# Patient Record
Sex: MALE | Race: OTHER | HISPANIC OR LATINO | Employment: PART TIME | URBAN - METROPOLITAN AREA
[De-identification: names, ages, dates, MRNs, and addresses within clinical notes are randomized per-mention and may not be internally consistent; named-entity substitution may affect disease eponyms.]

---

## 2021-04-05 ENCOUNTER — HOSPITAL ENCOUNTER (EMERGENCY)
Facility: HOSPITAL | Age: 41
Discharge: HOME | End: 2021-04-05
Attending: EMERGENCY MEDICINE | Admitting: EMERGENCY MEDICINE
Payer: COMMERCIAL

## 2021-04-05 ENCOUNTER — APPOINTMENT (EMERGENCY)
Dept: RADIOLOGY | Facility: HOSPITAL | Age: 41
End: 2021-04-05
Payer: COMMERCIAL

## 2021-04-05 VITALS
SYSTOLIC BLOOD PRESSURE: 132 MMHG | HEART RATE: 81 BPM | DIASTOLIC BLOOD PRESSURE: 82 MMHG | WEIGHT: 170 LBS | TEMPERATURE: 98.1 F | BODY MASS INDEX: 29.02 KG/M2 | RESPIRATION RATE: 16 BRPM | OXYGEN SATURATION: 97 % | HEIGHT: 64 IN

## 2021-04-05 DIAGNOSIS — S42.025A NONDISPLACED FRACTURE OF SHAFT OF LEFT CLAVICLE, INITIAL ENCOUNTER FOR CLOSED FRACTURE: Primary | ICD-10-CM

## 2021-04-05 PROCEDURE — 96372 THER/PROPH/DIAG INJ SC/IM: CPT

## 2021-04-05 PROCEDURE — 99284 EMERGENCY DEPT VISIT MOD MDM: CPT | Performed by: EMERGENCY MEDICINE

## 2021-04-05 PROCEDURE — 73030 X-RAY EXAM OF SHOULDER: CPT

## 2021-04-05 PROCEDURE — 99284 EMERGENCY DEPT VISIT MOD MDM: CPT

## 2021-04-05 RX ORDER — OXYCODONE HYDROCHLORIDE 5 MG/1
5 TABLET ORAL ONCE
Status: COMPLETED | OUTPATIENT
Start: 2021-04-05 | End: 2021-04-05

## 2021-04-05 RX ORDER — KETOROLAC TROMETHAMINE 30 MG/ML
15 INJECTION, SOLUTION INTRAMUSCULAR; INTRAVENOUS ONCE
Status: COMPLETED | OUTPATIENT
Start: 2021-04-05 | End: 2021-04-05

## 2021-04-05 RX ORDER — LIDOCAINE 50 MG/G
1 PATCH TOPICAL ONCE
Status: DISCONTINUED | OUTPATIENT
Start: 2021-04-05 | End: 2021-04-05 | Stop reason: HOSPADM

## 2021-04-05 RX ORDER — OXYCODONE HYDROCHLORIDE 5 MG/1
5 TABLET ORAL EVERY 6 HOURS PRN
Qty: 8 TABLET | Refills: 0 | Status: SHIPPED | OUTPATIENT
Start: 2021-04-05 | End: 2021-05-06 | Stop reason: ALTCHOICE

## 2021-04-05 RX ADMIN — LIDOCAINE 1 PATCH: 50 PATCH TOPICAL at 09:27

## 2021-04-05 RX ADMIN — KETOROLAC TROMETHAMINE 15 MG: 30 INJECTION, SOLUTION INTRAMUSCULAR at 09:36

## 2021-04-05 RX ADMIN — OXYCODONE HYDROCHLORIDE 5 MG: 5 TABLET ORAL at 10:05

## 2021-04-05 NOTE — ED ATTENDING ATTESTATION
4/5/2021  IKeeley MD, saw and evaluated the patient  I have discussed the patient with the resident/non-physician practitioner and agree with the resident's/non-physician practitioner's findings, Plan of Care, and MDM as documented in the resident's/non-physician practitioner's note, except where noted  All available labs and Radiology studies were reviewed  I was present for key portions of any procedure(s) performed by the resident/non-physician practitioner and I was immediately available to provide assistance  At this point I agree with the current assessment done in the Emergency Department  I have conducted an independent evaluation of this patient a history and physical is as follows:    70-year-old man presenting with left shoulder injury  Patient states he tripped going down stairs and fell forward with his left shoulder striking a wall  Had immediate onset of pain to the left anterior shoulder  No posterior shoulder pain  No numbness or tingling  No other complaints  Patient is awake and alert, appears uncomfortable  Head is atraumatic normocephalic  Neck is supple there is tenderness over the mid clavicle  No laceration or tenting of skin  No lateral or posterior shoulder tenderness  2+ radial pulse, distal motor and sensory intact  X-ray shows mid clavicular fracture  Will place patient in shoulder sling and give referral to Orthopedics for follow-up  Will provide short-term pain medication prescription      ED Course         Critical Care Time  Procedures

## 2021-04-05 NOTE — ED PROVIDER NOTES
History  Chief Complaint   Patient presents with   Chester Nearing     fell down 2 steps and injured left shoulder and back, did not strike head, no loc     HPI  37 yo male with no significant PMH presents to the ED with left shoulder pain s/p slipping down 2 stairs and falling to his left into a wall this morning  No head strike, LOC, or neck pain  No numbness or weakness in the hand  No elbow or wrist pain  No chest pain or shortness of breath  Pain is worse with movement of the shoulder  Pt does not take any blood thinners  No prior hx shoulder injuries  No other complaints at this time  None       History reviewed  No pertinent past medical history  Past Surgical History:   Procedure Laterality Date    NEPHRECTOMY Right        History reviewed  No pertinent family history  I have reviewed and agree with the history as documented  E-Cigarette/Vaping     E-Cigarette/Vaping Substances     Social History     Tobacco Use    Smoking status: Current Every Day Smoker     Packs/day: 0 50     Types: Cigarettes   Substance Use Topics    Alcohol use: Not on file    Drug use: Not on file        Review of Systems   Constitutional: Negative for chills and fever  HENT: Negative for congestion, rhinorrhea and sore throat  Respiratory: Negative for cough and shortness of breath  Cardiovascular: Negative for chest pain and palpitations  Gastrointestinal: Negative for abdominal pain, nausea and vomiting  Genitourinary: Negative for dysuria and hematuria  Musculoskeletal: Positive for arthralgias and myalgias  Negative for neck pain  Skin: Negative for rash  Neurological: Negative for dizziness, syncope, weakness, light-headedness, numbness and headaches  All other systems reviewed and are negative        Physical Exam  ED Triage Vitals   Temperature Pulse Respirations Blood Pressure SpO2   04/05/21 0906 04/05/21 0904 04/05/21 0904 04/05/21 0904 04/05/21 0904   98 1 °F (36 7 °C) 81 16 132/82 97 %      Temp Source Heart Rate Source Patient Position - Orthostatic VS BP Location FiO2 (%)   04/05/21 0906 -- -- -- --   Oral          Pain Score       04/05/21 0904       9             Orthostatic Vital Signs  Vitals:    04/05/21 0904   BP: 132/82   Pulse: 81       Physical Exam  Vitals signs and nursing note reviewed  Constitutional:       General: He is not in acute distress  Appearance: He is well-developed  He is not diaphoretic  HENT:      Head: Normocephalic and atraumatic  Right Ear: External ear normal       Left Ear: External ear normal       Mouth/Throat:      Mouth: Mucous membranes are moist    Eyes:      Conjunctiva/sclera: Conjunctivae normal       Pupils: Pupils are equal, round, and reactive to light  Neck:      Musculoskeletal: Normal range of motion and neck supple  No neck rigidity or spinous process tenderness  Trachea: Trachea normal    Cardiovascular:      Rate and Rhythm: Normal rate and regular rhythm  Pulses:           Radial pulses are 2+ on the right side and 2+ on the left side  Heart sounds: Normal heart sounds  No murmur  No friction rub  No gallop  Pulmonary:      Effort: Pulmonary effort is normal  No respiratory distress  Breath sounds: Normal breath sounds  No wheezing, rhonchi or rales  Abdominal:      General: Bowel sounds are normal  There is no distension  Palpations: Abdomen is soft  Tenderness: There is no abdominal tenderness  Musculoskeletal:      Left shoulder: He exhibits decreased range of motion (limited abduction due to pain), bony tenderness (anterior shoulder/clavicle) and pain  He exhibits no swelling, no effusion, no crepitus and no deformity  Left elbow: He exhibits normal range of motion  No tenderness found  Left wrist: He exhibits normal range of motion and no tenderness  Cervical back: He exhibits no bony tenderness  Thoracic back: He exhibits no bony tenderness        Lumbar back: He exhibits no bony tenderness  Lymphadenopathy:      Cervical: No cervical adenopathy  Skin:     General: Skin is warm and dry  Neurological:      Mental Status: He is alert and oriented to person, place, and time  Cranial Nerves: No cranial nerve deficit  Sensory: No sensory deficit  ED Medications  Medications   ketorolac (TORADOL) injection 15 mg (15 mg Intramuscular Given 4/5/21 0936)   oxyCODONE (ROXICODONE) IR tablet 5 mg (5 mg Oral Given 4/5/21 1005)       Diagnostic Studies  Results Reviewed     None                 XR shoulder 2+ views LEFT   ED Interpretation by Maris Garcia MD (04/05 7152)   Clavicle fracture  Final Result by Hayden Washington MD (04/05 1032)      Acute mildly displaced oblique mid clavicle fracture      Workstation performed: [id number]               Procedures  Procedures      ED Course  ED Course as of Apr 05 1300   Mon Apr 05, 2021   1422 Clavicle fracture, not significantly displaced  Will apply sling and discharge with strict return precautions and ortho follow up   XR shoulder 2+ views LEFT                                       [facility]  39 yo male with PMH left shoulder pain s/p slip and fall down stairs without head strike  Will obtain XR left shoulder to evaluate for bony injury, treat with toradol and lidoderm and reassess  Pt will likely be discharged to follow up with orthopedic surgery and strict return precautions     Disposition  Final diagnoses:   Nondisplaced fracture of shaft of left clavicle, initial encounter for closed fracture     Time reflects when diagnosis was documented in both MDM as applicable and the Disposition within this note     Time User Action Codes Description Comment    4/5/2021  9:42 AM Regina Brochure Add [S42 025A] Nondisplaced fracture of shaft of left clavicle, initial encounter for closed fracture       ED Disposition     ED Disposition Condition Date/Time Comment    Discharge Stable Mon Apr 5, 2021  9:41 AM Hollie Dunnell discharge to home/self care  Follow-up Information     Follow up With Specialties Details Why Contact Info Additional [address] [facility] Orthopedic Surgery Schedule an appointment as soon as possible for a visit in 3 days  [place] [address]  [phone] [address] [address], [place], [place], [address]          Discharge Medication List as of 4/5/2021  9:55 AM      START taking these medications    Details   oxyCODONE (ROXICODONE) 5 mg immediate release tablet Take 1 tablet (5 mg total) by mouth every 6 (six) hours as needed for moderate pain for up to 8 dosesMax Daily Amount: 20 mg, Starting Mon 4/5/2021, Normal           No discharge procedures on file  PDMP Review     None           ED Provider  Attending physically available and evaluated Mick Wilhelm I managed the patient along with the ED Attending      Electronically Signed by         Kevyn Alberts MD  04/05/21 0396

## 2021-04-05 NOTE — Clinical Note
Kelley Álvarez was seen and treated in our emergency department on 4/5/2021  No use of the left arm until cleared by orthopedic specialist    Diagnosis:     Pieter Rhodes    He may return on this date: 04/07/2021         If you have any questions or concerns, please don't hesitate to call        Atha Severance, MD    ______________________________           _______________          _______________  Hospital Representative                              Date                                Time

## 2021-04-05 NOTE — DISCHARGE INSTRUCTIONS
Take ibuprofen and tylenol for pain as needed  For more severe pain take prescribed oxycodone  Do not take oxycodone if working or driving  Wear sling as directed  Follow up with orthopedic doctor for further management  Return to the emergency department for severe worsening of pain, numbness in your hand, any other new or concerning symptoms  Christine Moors y tylenol para el dolor según sea necesario  Para un dolor más severo, tome oxicodona recetada  No tome oxicodona si trabaja o conduce  Use un cabestrillo lucio se le indique  Gordon un seguimiento con el médico ortopédico para un tratamiento adicional  Regrese al departamento de emergencias por un empeoramiento severo del dolor, entumecimiento en la mano o cualquier otro síntoma nuevo o preocupante

## 2021-04-17 ENCOUNTER — HOSPITAL ENCOUNTER (EMERGENCY)
Facility: HOSPITAL | Age: 41
Discharge: HOME | End: 2021-04-18
Attending: EMERGENCY MEDICINE | Admitting: EMERGENCY MEDICINE
Payer: COMMERCIAL

## 2021-04-17 DIAGNOSIS — L03.115 CELLULITIS OF RIGHT ANTERIOR LOWER LEG: Primary | ICD-10-CM

## 2021-04-17 PROCEDURE — 99283 EMERGENCY DEPT VISIT LOW MDM: CPT

## 2021-04-17 PROCEDURE — 99284 EMERGENCY DEPT VISIT MOD MDM: CPT | Performed by: EMERGENCY MEDICINE

## 2021-04-18 VITALS
HEART RATE: 62 BPM | BODY MASS INDEX: 32.44 KG/M2 | RESPIRATION RATE: 18 BRPM | DIASTOLIC BLOOD PRESSURE: 72 MMHG | WEIGHT: 189 LBS | TEMPERATURE: 97.8 F | SYSTOLIC BLOOD PRESSURE: 121 MMHG | OXYGEN SATURATION: 97 %

## 2021-04-18 RX ORDER — SULFAMETHOXAZOLE AND TRIMETHOPRIM 800; 160 MG/1; MG/1
1 TABLET ORAL 2 TIMES DAILY
Qty: 14 TABLET | Refills: 0 | Status: SHIPPED | OUTPATIENT
Start: 2021-04-18 | End: 2021-04-25

## 2021-04-18 RX ORDER — CEPHALEXIN 250 MG/1
500 CAPSULE ORAL 4 TIMES DAILY
Qty: 56 CAPSULE | Refills: 0 | Status: SHIPPED | OUTPATIENT
Start: 2021-04-18 | End: 2021-04-25

## 2021-04-18 RX ORDER — CEPHALEXIN 250 MG/1
500 CAPSULE ORAL ONCE
Status: COMPLETED | OUTPATIENT
Start: 2021-04-18 | End: 2021-04-18

## 2021-04-18 RX ORDER — SULFAMETHOXAZOLE AND TRIMETHOPRIM 800; 160 MG/1; MG/1
1 TABLET ORAL ONCE
Status: COMPLETED | OUTPATIENT
Start: 2021-04-18 | End: 2021-04-18

## 2021-04-18 RX ADMIN — CEPHALEXIN 500 MG: 250 CAPSULE ORAL at 00:33

## 2021-04-18 RX ADMIN — SULFAMETHOXAZOLE AND TRIMETHOPRIM 1 TABLET: 800; 160 TABLET ORAL at 00:33

## 2021-04-18 NOTE — ED PROVIDER NOTES
History  Chief Complaint   Patient presents with    Wound Check     Pt with wound to right shin from fall on Monday  Pt reports applying antibiotic oinment and taking amoxicillin for  days  Right shin now reddened and painful to touch  HPI  39 yo male with no significant known PMH presents to the ED with concern for increasing pain and redness of the skin around a laceration he sustained to his right anterior lower leg when falling down 2 stairs 2 weeks ago  States he cut himself on a piece of jagged wood  Pt was evaluated in the ED at that time and found to have a nondisplaced fracture of the left clavicle, however he did not mention laceration to his leg when he was seen at that time  Pt states he has been applying over the counter bacitracin at home and doing salt water soaks, however he was developing signs of infection so for the last 5 days has been taking a medication that he believes is amoxicillin 500 mg Q8H  Pt is adamant he bought this medication from the store without a prescription, but he cannot identify which store to me  No fevers, chills, nausea, vomiting, chest pain, SOB, abdominal pain, dizziness, lightheadedness, numbness in the foot, difficulty walking  Pt has not yet made appointment to follow up with orthopedics about his clavicle  Prior to Admission Medications   Prescriptions Last Dose Informant Patient Reported? Taking?   oxyCODONE (ROXICODONE) 5 mg immediate release tablet   No Yes   Sig: Take 1 tablet (5 mg total) by mouth every 6 (six) hours as needed for moderate pain for up to 8 dosesMax Daily Amount: 20 mg      Facility-Administered Medications: None       History reviewed  No pertinent past medical history  Past Surgical History:   Procedure Laterality Date    NEPHRECTOMY Right        History reviewed  No pertinent family history  I have reviewed and agree with the history as documented      E-Cigarette/Vaping    E-Cigarette Use Never User      E-Cigarette/Vaping Substances     Social History     Tobacco Use    Smoking status: Former Smoker     Packs/day: 0 50     Types: Cigarettes    Smokeless tobacco: Never Used   Substance Use Topics    Alcohol use: Never     Frequency: Never    Drug use: Yes     Types: Marijuana        Review of Systems   Constitutional: Negative for chills and fever  HENT: Negative for congestion, rhinorrhea and sore throat  Respiratory: Negative for cough and shortness of breath  Cardiovascular: Negative for chest pain and palpitations  Gastrointestinal: Negative for abdominal pain, nausea and vomiting  Genitourinary: Negative for dysuria and hematuria  Musculoskeletal: Positive for arthralgias and myalgias  Skin: Positive for color change and wound  Negative for rash  Neurological: Negative for dizziness, weakness, light-headedness, numbness and headaches  All other systems reviewed and are negative  Physical Exam  ED Triage Vitals [04/18/21 0001]   Temperature Pulse Respirations Blood Pressure SpO2   97 8 °F (36 6 °C) 62 18 121/72 97 %      Temp Source Heart Rate Source Patient Position - Orthostatic VS BP Location FiO2 (%)   Oral Monitor Lying Right arm --      Pain Score       9             Orthostatic Vital Signs  Vitals:    04/18/21 0001   BP: 121/72   Pulse: 62   Patient Position - Orthostatic VS: Lying       Physical Exam  Constitutional:       General: He is not in acute distress  Appearance: He is well-developed  He is not diaphoretic  HENT:      Head: Normocephalic and atraumatic  Right Ear: External ear normal       Left Ear: External ear normal    Eyes:      Conjunctiva/sclera: Conjunctivae normal       Pupils: Pupils are equal, round, and reactive to light  Neck:      Musculoskeletal: Normal range of motion and neck supple  Cardiovascular:      Rate and Rhythm: Normal rate and regular rhythm  Pulses:           Dorsalis pedis pulses are 2+ on the right side        Heart sounds: Normal heart sounds  No murmur  No friction rub  No gallop  Pulmonary:      Effort: Pulmonary effort is normal  No respiratory distress  Breath sounds: Normal breath sounds  No wheezing, rhonchi or rales  Abdominal:      General: Bowel sounds are normal  There is no distension  Palpations: Abdomen is soft  Tenderness: There is no abdominal tenderness  Musculoskeletal: Normal range of motion  Left shoulder: He exhibits tenderness (over left clavicle  Pt is wearing sling)  Lymphadenopathy:      Cervical: No cervical adenopathy  Skin:     General: Skin is warm and dry  Comments: Right anterior lower leg approximately 5 cm old healing laceration with granulated tissue, surrounding erythema and mild tenderness   Neurological:      Mental Status: He is alert and oriented to person, place, and time  Cranial Nerves: No cranial nerve deficit  Sensory: Sensation is intact  No sensory deficit  Motor: Motor function is intact  Gait: Gait is intact  ED Medications  Medications   cephalexin (KEFLEX) capsule 500 mg (500 mg Oral Given 4/18/21 0033)   sulfamethoxazole-trimethoprim (BACTRIM DS) 800-160 mg per tablet 1 tablet (1 tablet Oral Given 4/18/21 0033)       Diagnostic Studies  Results Reviewed     None                 No orders to display         Procedures  Procedures      ED Course                                       MDM  37 yo male with pain and redness surrounding old laceration sustained 2 weeks ago  Concerning for cellulitis  Pt does not have signs of sepsis at this time  Unclear if he has been taking antibiotics at home, because they were not prescribed to him  Will prescribe course of keflex and bactrim and advise pt to follow up with PCP and also with ortho as previously directed  Given strict return precautions     Disposition  Final diagnoses:   Cellulitis of right anterior lower leg     Time reflects when diagnosis was documented in both MDM as applicable and the Disposition within this note     Time User Action Codes Description Comment    4/18/2021 12:22 AM Pradip Cason Add [O72 [id number]] Cellulitis of right anterior lower leg       ED Disposition     ED Disposition Condition Date/Time Comment    Discharge Stable Sun Apr 18, 2021 12:22 AM Brain Richmond discharge to home/self care  Follow-up Information     Follow up With Specialties Details Why [address] [facility] Schedule an appointment as soon as possible for a visit in 1 week  [address] [address] [address] [facility] [address]  [facility] [address], [address], [address] [phone]          Discharge Medication List as of 4/18/2021 12:27 AM      START taking these medications    Details   cephalexin (KEFLEX) 250 mg capsule Take 2 capsules (500 mg total) by mouth 4 (four) times a day for 7 days, Starting Sun 4/18/2021, Until Sun 4/25/2021, Normal      sulfamethoxazole-trimethoprim (BACTRIM DS) 800-160 mg per tablet Take 1 tablet by mouth 2 (two) times a day for 7 days smx-tmp DS (BACTRIM) 800-160 mg tabs (1tab q12 D10), Starting Sun 4/18/2021, Until Sun 4/25/2021, Normal         CONTINUE these medications which have NOT CHANGED    Details   oxyCODONE (ROXICODONE) 5 mg immediate release tablet Take 1 tablet (5 mg total) by mouth every 6 (six) hours as needed for moderate pain for up to 8 dosesMax Daily Amount: 20 mg, Starting Mon 4/5/2021, Normal           No discharge procedures on file  PDMP Review     None           ED Provider  Attending physically available and evaluated Brain Basilio FLORES managed the patient along with the ED Attending      Electronically Signed by         Jannet Hull MD  04/18/21 9742

## 2021-04-18 NOTE — DISCHARGE INSTRUCTIONS
[place] antibióticos según lo prescrito  Gordon un seguimiento con el médico de atención primaria en 1 semana  Regrese al departamento de emergencias si empeora el enrojecimiento, empeoramiento de la hinchazón, entumecimiento en el pie, fiebre, vómitos o cualquier otro síntoma nuevo o preocupante  Take antibiotics as prescribed  Follow up with primary care physician in 1 week  Return to the emergency department for worsening redness, worsening swelling, numbness in the foot, fever, vomiting, any other new or concerning symptoms
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
## 2021-05-05 ENCOUNTER — APPOINTMENT (OUTPATIENT)
Dept: LAB | Facility: CLINIC | Age: 41
End: 2021-05-05

## 2021-05-05 ENCOUNTER — OFFICE VISIT (OUTPATIENT)
Dept: INTERNAL MEDICINE CLINIC | Facility: CLINIC | Age: 41
End: 2021-05-05

## 2021-05-05 VITALS
HEIGHT: 64 IN | SYSTOLIC BLOOD PRESSURE: 114 MMHG | DIASTOLIC BLOOD PRESSURE: 72 MMHG | HEART RATE: 79 BPM | TEMPERATURE: 96.6 F | BODY MASS INDEX: 30.66 KG/M2 | WEIGHT: 179.6 LBS | OXYGEN SATURATION: 96 %

## 2021-05-05 DIAGNOSIS — Z90.5 H/O RIGHT NEPHRECTOMY: ICD-10-CM

## 2021-05-05 DIAGNOSIS — R29.898 WEAKNESS OF RIGHT LOWER EXTREMITY: ICD-10-CM

## 2021-05-05 DIAGNOSIS — Z00.00 HEALTHCARE MAINTENANCE: ICD-10-CM

## 2021-05-05 DIAGNOSIS — L03.115 CELLULITIS OF RIGHT ANTERIOR LOWER LEG: Primary | ICD-10-CM

## 2021-05-05 DIAGNOSIS — Z23 NEED FOR COVID-19 VACCINE: ICD-10-CM

## 2021-05-05 DIAGNOSIS — Z76.89 ENCOUNTER TO ESTABLISH CARE: ICD-10-CM

## 2021-05-05 DIAGNOSIS — Z00.00 ANNUAL PHYSICAL EXAM: ICD-10-CM

## 2021-05-05 DIAGNOSIS — Z11.4 SCREENING FOR HIV (HUMAN IMMUNODEFICIENCY VIRUS): ICD-10-CM

## 2021-05-05 DIAGNOSIS — Z13.31 DEPRESSION SCREENING: ICD-10-CM

## 2021-05-05 DIAGNOSIS — S42.022A DISPLACED FRACTURE OF SHAFT OF LEFT CLAVICLE, INITIAL ENCOUNTER FOR CLOSED FRACTURE: ICD-10-CM

## 2021-05-05 LAB
ALBUMIN SERPL BCP-MCNC: 4 G/DL (ref 3.5–5)
ALP SERPL-CCNC: 103 U/L (ref 46–116)
ALT SERPL W P-5'-P-CCNC: 47 U/L (ref 12–78)
ANION GAP SERPL CALCULATED.3IONS-SCNC: 6 MMOL/L (ref 4–13)
AST SERPL W P-5'-P-CCNC: 33 U/L (ref 5–45)
BASOPHILS # BLD AUTO: 0.08 THOUSANDS/ΜL (ref 0–0.1)
BASOPHILS NFR BLD AUTO: 1 % (ref 0–1)
BILIRUB SERPL-MCNC: 0.33 MG/DL (ref 0.2–1)
BUN SERPL-MCNC: 13 MG/DL (ref 5–25)
CALCIUM SERPL-MCNC: 9.1 MG/DL (ref 8.3–10.1)
CHLORIDE SERPL-SCNC: 106 MMOL/L (ref 100–108)
CHOLEST SERPL-MCNC: 179 MG/DL (ref 50–200)
CO2 SERPL-SCNC: 26 MMOL/L (ref 21–32)
CREAT SERPL-MCNC: 0.72 MG/DL (ref 0.6–1.3)
EOSINOPHIL # BLD AUTO: 0.36 THOUSAND/ΜL (ref 0–0.61)
EOSINOPHIL NFR BLD AUTO: 5 % (ref 0–6)
ERYTHROCYTE [DISTWIDTH] IN BLOOD BY AUTOMATED COUNT: 13.1 % (ref 11.6–15.1)
EST. AVERAGE GLUCOSE BLD GHB EST-MCNC: 114 MG/DL
GFR SERPL CREATININE-BSD FRML MDRD: 116 ML/MIN/1.73SQ M
GLUCOSE P FAST SERPL-MCNC: 99 MG/DL (ref 65–99)
HBA1C MFR BLD: 5.6 %
HCT VFR BLD AUTO: 49.1 % (ref 36.5–49.3)
HDLC SERPL-MCNC: 43 MG/DL
HGB BLD-MCNC: 15.9 G/DL (ref 12–17)
IMM GRANULOCYTES # BLD AUTO: 0.04 THOUSAND/UL (ref 0–0.2)
IMM GRANULOCYTES NFR BLD AUTO: 1 % (ref 0–2)
LDLC SERPL CALC-MCNC: 94 MG/DL (ref 0–100)
LYMPHOCYTES # BLD AUTO: 1.95 THOUSANDS/ΜL (ref 0.6–4.47)
LYMPHOCYTES NFR BLD AUTO: 25 % (ref 14–44)
MCH RBC QN AUTO: 30.1 PG (ref 26.8–34.3)
MCHC RBC AUTO-ENTMCNC: 32.4 G/DL (ref 31.4–37.4)
MCV RBC AUTO: 93 FL (ref 82–98)
MONOCYTES # BLD AUTO: 0.84 THOUSAND/ΜL (ref 0.17–1.22)
MONOCYTES NFR BLD AUTO: 11 % (ref 4–12)
NEUTROPHILS # BLD AUTO: 4.64 THOUSANDS/ΜL (ref 1.85–7.62)
NEUTS SEG NFR BLD AUTO: 57 % (ref 43–75)
NONHDLC SERPL-MCNC: 136 MG/DL
NRBC BLD AUTO-RTO: 0 /100 WBCS
PLATELET # BLD AUTO: 283 THOUSANDS/UL (ref 149–390)
PMV BLD AUTO: 10.5 FL (ref 8.9–12.7)
POTASSIUM SERPL-SCNC: 4.1 MMOL/L (ref 3.5–5.3)
PROT SERPL-MCNC: 7.7 G/DL (ref 6.4–8.2)
RBC # BLD AUTO: 5.28 MILLION/UL (ref 3.88–5.62)
SODIUM SERPL-SCNC: 138 MMOL/L (ref 136–145)
TRIGL SERPL-MCNC: 209 MG/DL
WBC # BLD AUTO: 7.91 THOUSAND/UL (ref 4.31–10.16)

## 2021-05-05 PROCEDURE — 87591 N.GONORRHOEAE DNA AMP PROB: CPT

## 2021-05-05 PROCEDURE — 80061 LIPID PANEL: CPT

## 2021-05-05 PROCEDURE — 85025 COMPLETE CBC W/AUTO DIFF WBC: CPT

## 2021-05-05 PROCEDURE — 80053 COMPREHEN METABOLIC PANEL: CPT

## 2021-05-05 PROCEDURE — 1036F TOBACCO NON-USER: CPT | Performed by: INTERNAL MEDICINE

## 2021-05-05 PROCEDURE — 3725F SCREEN DEPRESSION PERFORMED: CPT | Performed by: INTERNAL MEDICINE

## 2021-05-05 PROCEDURE — 86803 HEPATITIS C AB TEST: CPT

## 2021-05-05 PROCEDURE — 99204 OFFICE O/P NEW MOD 45 MIN: CPT | Performed by: INTERNAL MEDICINE

## 2021-05-05 PROCEDURE — 83036 HEMOGLOBIN GLYCOSYLATED A1C: CPT

## 2021-05-05 PROCEDURE — 87491 CHLMYD TRACH DNA AMP PROBE: CPT

## 2021-05-05 PROCEDURE — 36415 COLL VENOUS BLD VENIPUNCTURE: CPT

## 2021-05-05 PROCEDURE — 87389 HIV-1 AG W/HIV-1&-2 AB AG IA: CPT

## 2021-05-06 ENCOUNTER — APPOINTMENT (OUTPATIENT)
Dept: RADIOLOGY | Facility: OTHER | Age: 41
End: 2021-05-06

## 2021-05-06 ENCOUNTER — OFFICE VISIT (OUTPATIENT)
Dept: OBGYN CLINIC | Facility: OTHER | Age: 41
End: 2021-05-06
Payer: COMMERCIAL

## 2021-05-06 VITALS
HEART RATE: 94 BPM | SYSTOLIC BLOOD PRESSURE: 132 MMHG | DIASTOLIC BLOOD PRESSURE: 84 MMHG | HEIGHT: 64 IN | WEIGHT: 178 LBS | BODY MASS INDEX: 30.39 KG/M2

## 2021-05-06 DIAGNOSIS — S42.022A DISPLACED FRACTURE OF SHAFT OF LEFT CLAVICLE, INITIAL ENCOUNTER FOR CLOSED FRACTURE: ICD-10-CM

## 2021-05-06 PROBLEM — E78.1 HIGH BLOOD TRIGLYCERIDES: Status: ACTIVE | Noted: 2021-05-06

## 2021-05-06 LAB
C TRACH DNA SPEC QL NAA+PROBE: NEGATIVE
HIV 1+2 AB+HIV1 P24 AG SERPL QL IA: NORMAL
N GONORRHOEA DNA SPEC QL NAA+PROBE: NEGATIVE

## 2021-05-06 PROCEDURE — 23500 CLTX CLAVICULAR FX W/O MNPJ: CPT | Performed by: ORTHOPAEDIC SURGERY

## 2021-05-06 PROCEDURE — 3008F BODY MASS INDEX DOCD: CPT | Performed by: INTERNAL MEDICINE

## 2021-05-06 PROCEDURE — 99203 OFFICE O/P NEW LOW 30 MIN: CPT | Performed by: ORTHOPAEDIC SURGERY

## 2021-05-06 PROCEDURE — 73000 X-RAY EXAM OF COLLAR BONE: CPT
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
## 2021-05-07 LAB — MISCELLANEOUS LAB TEST RESULT: NORMAL

## 2021-07-08 ENCOUNTER — APPOINTMENT (OUTPATIENT)
Dept: RADIOLOGY | Facility: OTHER | Age: 41
End: 2021-07-08
Payer: COMMERCIAL

## 2021-07-08 ENCOUNTER — OFFICE VISIT (OUTPATIENT)
Dept: OBGYN CLINIC | Facility: OTHER | Age: 41
End: 2021-07-08
Payer: COMMERCIAL

## 2021-07-08 ENCOUNTER — TELEPHONE (OUTPATIENT)
Dept: OBGYN CLINIC | Facility: OTHER | Age: 41
End: 2021-07-08

## 2021-07-08 ENCOUNTER — APPOINTMENT (OUTPATIENT)
Dept: RADIOLOGY | Facility: OTHER | Age: 41
End: 2021-07-08

## 2021-07-08 ENCOUNTER — HOSPITAL ENCOUNTER (OUTPATIENT)
Dept: NON INVASIVE DIAGNOSTICS | Facility: CLINIC | Age: 41
Discharge: HOME | End: 2021-07-08

## 2021-07-08 ENCOUNTER — HOSPITAL ENCOUNTER (EMERGENCY)
Facility: HOSPITAL | Age: 41
Discharge: HOME | End: 2021-07-08
Attending: EMERGENCY MEDICINE
Payer: COMMERCIAL

## 2021-07-08 VITALS
BODY MASS INDEX: 30.05 KG/M2 | DIASTOLIC BLOOD PRESSURE: 78 MMHG | WEIGHT: 176 LBS | HEIGHT: 64 IN | HEART RATE: 88 BPM | SYSTOLIC BLOOD PRESSURE: 121 MMHG

## 2021-07-08 VITALS
OXYGEN SATURATION: 98 % | DIASTOLIC BLOOD PRESSURE: 77 MMHG | TEMPERATURE: 98.1 F | HEART RATE: 85 BPM | SYSTOLIC BLOOD PRESSURE: 119 MMHG | RESPIRATION RATE: 18 BRPM

## 2021-07-08 DIAGNOSIS — M25.571 PAIN, JOINT, ANKLE AND FOOT, RIGHT: ICD-10-CM

## 2021-07-08 DIAGNOSIS — S42.022A DISPLACED FRACTURE OF SHAFT OF LEFT CLAVICLE, INITIAL ENCOUNTER FOR CLOSED FRACTURE: ICD-10-CM

## 2021-07-08 DIAGNOSIS — M79.661 PAIN OF RIGHT CALF: ICD-10-CM

## 2021-07-08 DIAGNOSIS — M22.8X9 PATELLA BAJA: ICD-10-CM

## 2021-07-08 DIAGNOSIS — M79.89 CALF SWELLING: ICD-10-CM

## 2021-07-08 DIAGNOSIS — M25.561 RIGHT KNEE PAIN, UNSPECIFIED CHRONICITY: ICD-10-CM

## 2021-07-08 DIAGNOSIS — S42.022D CLOSED DISPLACED FRACTURE OF SHAFT OF LEFT CLAVICLE WITH ROUTINE HEALING, SUBSEQUENT ENCOUNTER: Primary | ICD-10-CM

## 2021-07-08 DIAGNOSIS — I82.439: Primary | ICD-10-CM

## 2021-07-08 PROCEDURE — 1036F TOBACCO NON-USER: CPT | Performed by: ORTHOPAEDIC SURGERY

## 2021-07-08 PROCEDURE — 93971 EXTREMITY STUDY: CPT

## 2021-07-08 PROCEDURE — 99024 POSTOP FOLLOW-UP VISIT: CPT | Performed by: ORTHOPAEDIC SURGERY

## 2021-07-08 PROCEDURE — 99214 OFFICE O/P EST MOD 30 MIN: CPT | Performed by: ORTHOPAEDIC SURGERY

## 2021-07-08 PROCEDURE — 73610 X-RAY EXAM OF ANKLE: CPT

## 2021-07-08 PROCEDURE — 99283 EMERGENCY DEPT VISIT LOW MDM: CPT

## 2021-07-08 PROCEDURE — 93971 EXTREMITY STUDY: CPT | Performed by: SURGERY

## 2021-07-08 PROCEDURE — 73562 X-RAY EXAM OF KNEE 3: CPT

## 2021-07-08 PROCEDURE — 99284 EMERGENCY DEPT VISIT MOD MDM: CPT | Performed by: EMERGENCY MEDICINE

## 2021-07-08 PROCEDURE — 3008F BODY MASS INDEX DOCD: CPT | Performed by: ORTHOPAEDIC SURGERY

## 2021-07-08 PROCEDURE — 73000 X-RAY EXAM OF COLLAR BONE: CPT

## 2021-07-08 RX ADMIN — RIVAROXABAN 15 MG: 15 TABLET, FILM COATED ORAL at 16:50

## 2021-07-08 NOTE — TELEPHONE ENCOUNTER
Patient sees Dr Jodi Tomas      Patient positive for DVT per Roosevelt Alvarenga @ [facility]       Ext [phone]
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
## 2021-07-08 NOTE — ED ATTENDING ATTESTATION
Final Diagnosis:  1  Popliteal DVT (deep venous thrombosis) [facility])      ED Course as of Jul 10 0712   Thu Jul 08, 2021   0747 Discussed with Case management; script can be sent to [facility] and patient DC-ed  Should be able to afford  Stacia Man MD, saw and evaluated the patient  All available labs and X-rays were ordered by me or the resident and have been reviewed by myself  I discussed the patient with the resident / non-physician and agree with the resident's / non-physician practitioner's findings and plan as documented in the resident's / non-physician practicitioner's note, except where noted  At this point, I agree with the current assessment done in the ED  I was present during key portions of all procedures performed unless otherwise stated  Chief Complaint   Patient presents with    Medical Problem     pt presetns for evaluation of blood clot     This is a 38 y o  male presenting for evaluation of DVT  RIGHT LOWER LIMB  Evaluation shows acute occlusive thrombus in the distal femoral vein, popliteal  vein, one pair of gastrocnemius veins, and in the posterior tibial veins  (non-occlusive)  Peroneal veins not visualized due to severe swelling/edema  No evidence of superficial thrombophlebitis noted  Popliteal, posterior tibial and anterior tibial arterial Doppler waveforms are  Triphasic  No f/ch/n/v/cp/sob  Just pain/swelling in the calf  No palpitations    PMH:   has no past medical history on file  PSH:   has a past surgical history that includes Nephrectomy (Right)      Social:  Social History     Substance and Sexual Activity   Alcohol Use Yes    Comment: social     Social History     Tobacco Use   Smoking Status Former Smoker    Packs/day: 0 50    Types: Cigarettes   Smokeless Tobacco Never Used     Social History     Substance and Sexual Activity   Drug Use Yes    Types: Marijuana     PE:  Vitals:    07/08/21 1450 07/08/21 1452   BP:  119/77   BP Location: Right arm   Pulse:  85   Resp:  18   Temp: 98 1 °F (36 7 °C)    SpO2:  98%   General: VSS, NAD, awake, alert  Well-nourished, well-developed  Appears stated age  Head: Normocephalic, atraumatic, nontender  Eyes: PERRL, EOM-I  No diplopia  No hyphema  No subconjunctival hemorrhages  Symmetrical lids  ENTAtraumatic external nose and ears  MMM  No stridor  Normal phonation  No drooling  Base of mouth is soft  No mastoid tenderness  Neck: Symmetric, trachea midline  No JVD  CV: Peripheral pulses +2 throughout  No chest wall tenderness  Lungs:   Unlabored   No retractions  No crepitus  No tachypnea  No paradoxical motion  Abd: +BS, soft, NT/ND    MSK:   FROM   No lower extremity edema  Back:   No CVAT  Skin: Dry, intact  Neuro: AAOx3, GCS 15, CN II-XII grossly intact  Motor grossly intact  Psychiatric/Behavioral: Appropriate mood and affect   Exam: deferred  A:  - DVT  P:  - Xarelto (15 mg twice daily with food for 21 days followed by 20 mg once daily with food x3-6 months)    - 13 point ROS was performed and all are normal unless stated in the history above  - Nursing note reviewed  Vitals reviewed  - Orders placed by myself and/or advanced practitioner / resident     - Previous chart was reviewed  - No language barrier    - History obtained from patient  - There are no limitations to the history obtained  - Critical care time: Not applicable for this patient  Code Status: No Order  Advance Directive and Living Will:      Power of :    POLST:      Medications   rivaroxaban (XARELTO) tablet 15 mg (15 mg Oral Given 7/8/21 1650)     No orders to display     No orders of the defined types were placed in this encounter      Labs Reviewed - No data to display  Time reflects when diagnosis was documented in both MDM as applicable and the Disposition within this note       Time User Action Codes Description Comment    7/8/2021  3:43 PM Esau Kimbrough [I82 [id number]] Popliteal DVT (deep venous thrombosis) [facility])           ED Disposition       ED Disposition Condition Date/Time Comment    Discharge Stable Thu Jul 8, 2021  3:38 PM Taisha Abad discharge to home/self care  Follow-up Information       Follow up With Specialties Details Why Contact Info    Sonia Desai MD Family Medicine In 1 week for management of your DVT Barry Conti [address] [place] [phone]            Discharge Medication List as of 7/8/2021  4:55 PM        START taking these medications    Details   rivaroxaban (XARELTO) 15 & 20 MG starter pack Take 15 mg by mouth twice daily for 21 days, then 20 mg once daily thereafter , Normal           No discharge procedures on file  None       Portions of the record may have been created with voice recognition software  Occasional wrong word or "sound a like" substitutions may have occurred due to the inherent limitations of voice recognition software  Read the chart carefully and recognize, using context, where substitutions have occurred      Electronically signed by:  Albertina Andino

## 2021-07-08 NOTE — DISCHARGE INSTRUCTIONS
Thank you for coming to the Emergency Department today for care of your Leg pain and clot  We are sending you home with a prescription for Xarelto (rivaroxiban) which is a blood thinner and should help prevent the clot from going to your heart or lungs  Please follow up with your primary care doctor in 1 week to continue managing your clot and for your next prescription  Please return to the Emergency Department if you experience difficulty breathing, chest pain, numbness or any other concerning symptoms  Abena por venir al Departamento de Emergencias hoy para atender mtz dolor de pierna y mtz coágulo  Lo enviaremos a casa con agapito receta de Xarelto (rivaroxiban), que es un anticoagulante y debería ayudar a evitar que el coágulo llegue a mtz corazón o pulmones  Gordon un seguimiento con mtz médico de atención primaria en 1 semana para continuar controlando mtz coágulo y para mtz próxima receta  Regrese al [employer] de Emergencias si experimenta dificultad para respirar, dolor en el pecho, entumecimiento o cualquier otro síntoma preocupante

## 2021-07-08 NOTE — ED PROVIDER NOTES
History  Chief Complaint   Patient presents with    Medical Problem     pt presetns for evaluation of blood clot     53-year-old male presents with right popliteal DVT found at outside vascular center today  He had operative correction of a broken clavicle in May  Today he presented to his Orthopedic surgeons office with significant right lower extremity pain  On exam there is swelling tenderness is of his posterior right lower leg  Patient declines more recent surgery, artificial heart valves, active bleeding, recent trauma, hematologic disorders or other contraindications to anticoagulation  None       History reviewed  No pertinent past medical history  Past Surgical History:   Procedure Laterality Date    NEPHRECTOMY Right        Family History   Problem Relation Age of Onset    No Known Problems Father     Asthma Son     Asthma Daughter      I have reviewed and agree with the history as documented  E-Cigarette/Vaping    E-Cigarette Use Current Some Day User      E-Cigarette/Vaping Substances    Nicotine No     THC No     CBD No     Flavoring No     Other No     Unknown No      Social History     Tobacco Use    Smoking status: Former Smoker     Packs/day: 0 50     Types: Cigarettes    Smokeless tobacco: Never Used   Vaping Use    Vaping Use: Some days   Substance Use Topics    Alcohol use: Yes     Comment: social    Drug use: Yes     Types: Marijuana        Review of Systems   Constitutional: Negative for chills and fever  Respiratory: Negative for shortness of breath  Cardiovascular: Positive for leg swelling  Negative for chest pain  Gastrointestinal: Negative for abdominal pain  Skin: Negative for rash  Redness of RLE   Neurological: Negative for numbness  All other systems reviewed and are negative        Physical Exam  ED Triage Vitals   Temperature Pulse Respirations Blood Pressure SpO2   07/08/21 1450 07/08/21 1452 07/08/21 1452 07/08/21 1452 07/08/21 1452   98 1 °F (36 7 °C) 85 18 119/77 98 %      Temp src Heart Rate Source Patient Position - Orthostatic VS BP Location FiO2 (%)   -- 07/08/21 1452 07/08/21 1452 07/08/21 1452 --    Monitor Lying Right arm       Pain Score       --                    Orthostatic Vital Signs  Vitals:    07/08/21 1452   BP: 119/77   Pulse: 85   Patient Position - Orthostatic VS: Lying       Physical Exam  Vitals reviewed  Constitutional:       Appearance: Normal appearance  HENT:      Head: Normocephalic and atraumatic  Right Ear: External ear normal       Left Ear: External ear normal       Nose: Nose normal       Mouth/Throat:      Mouth: Mucous membranes are moist    Eyes:      Conjunctiva/sclera: Conjunctivae normal    Pulmonary:      Effort: Pulmonary effort is normal    Abdominal:      General: Abdomen is flat  Musculoskeletal:         General: Swelling (RLE) present  Skin:     General: Skin is warm  Comments: Redness of R posterior calf     Neurological:      General: No focal deficit present  Mental Status: He is alert  Psychiatric:         Mood and Affect: Mood normal          ED Medications  Medications   rivaroxaban (XARELTO) tablet 15 mg (15 mg Oral Given 7/8/21 1650)       Diagnostic Studies  Results Reviewed     None                 No orders to display         Procedures  Procedures      ED Course                             SBIRT 20yo+      Most Recent Value   SBIRT (23 yo +)   In order to provide better care to our patients, we are screening all of our patients for alcohol and drug use  Would it be okay to ask you these screening questions? No Filed at: 07/08/2021 1452                MDM  Number of Diagnoses or Management Options  Popliteal DVT (deep venous thrombosis) (HCC)  Diagnosis management comments: 70-year-old male presents after diagnosed right popliteal DVT at [facility] earlier today  Patient's mother was in the room to help translate    Outside documentation of vascular scan was reviewed by me  Anticoagulation was reviewed with the patient and patient did not endorse having any contraindication to treatment  Treatment including Xarelto for likely 6 months was reviewed with the patient and they expressed understanding  Coupon for Free 30 day supply of Xarelto provided to patient  Case Management was reached to help with outpatient access to the medication  After receiving a dose of Xarelto while in the ED, the patient was appropriate for discharge home with close follow-up with their primary care physician  Disposition  Final diagnoses:   Popliteal DVT (deep venous thrombosis) ([address] )     Time reflects when diagnosis was documented in both MDM as applicable and the Disposition within this note     Time User Action Codes Description Comment    7/8/2021  3:43 PM Edyth Lips Add [I82 439] Popliteal DVT (deep venous thrombosis) [facility])       ED Disposition     ED Disposition Condition Date/Time Comment    Discharge Stable u Jul 8, 2021  3:38 PM Joaquin Terry discharge to home/self care  Follow-up Information     Follow up With Specialties Details Why Contact Info    López Matos MD Family Medicine In 1 week for management of your DVT [address] [address]            Discharge Medication List as of 7/8/2021  4:55 PM      START taking these medications    Details   rivaroxaban (XARELTO) 15 & 20 MG starter pack Take 15 mg by mouth twice daily for 21 days, then 20 mg once daily thereafter , Normal           No discharge procedures on file  PDMP Review     None           ED Provider  Attending physically available and evaluated Joaquin Terry I managed the patient along with the ED Attending      Electronically Signed by         Garrett Brewer MD  07/08/21 3486       Garrett Brewer MD  07/08/21 0762

## 2021-07-08 NOTE — CASE MANAGEMENT
CM was consulted for assistance with medication Xarelto affordability -- Pt does not have his insurance card, unsure of Rx coverage  CM contacted [address] for miranda check -- No insurance information is in the system  [facility] pharmacist contacted pt's home pharmacy -- No insurance information in their system either  CM contacted pt's insurance company [facility], [phone]) and spoke to their representative -- Prescription numbers provided to CM:     BIN: [id number]  PCN: [id number]  Group: Lea Saba    The above was provided to [address] -- Insurance accepted  Pt's rx Xarelto will be free for the first 30 days, with a $3 co-pay thereafter  No further CM needs  CM will continue to follow to discharge

## 2022-09-28 ENCOUNTER — OFFICE VISIT (OUTPATIENT)
Dept: INTERNAL MEDICINE CLINIC | Facility: CLINIC | Age: 42
End: 2022-09-28

## 2022-09-28 VITALS — BODY MASS INDEX: 29.37 KG/M2 | HEIGHT: 64 IN | WEIGHT: 172 LBS

## 2022-09-28 DIAGNOSIS — M79.604 RIGHT LEG PAIN: Primary | ICD-10-CM

## 2022-09-28 DIAGNOSIS — R29.898 WEAKNESS OF RIGHT LOWER EXTREMITY: ICD-10-CM

## 2022-09-28 PROCEDURE — 99214 OFFICE O/P EST MOD 30 MIN: CPT | Performed by: INTERNAL MEDICINE

## 2022-09-28 RX ORDER — LIDOCAINE 50 MG/G
1 PATCH TOPICAL DAILY
Qty: 30 PATCH | Refills: 2 | Status: SHIPPED | OUTPATIENT
Start: 2022-09-28

## 2022-09-28 RX ORDER — METHOCARBAMOL 500 MG/1
500 TABLET, FILM COATED ORAL 3 TIMES DAILY
Qty: 21 TABLET | Refills: 0 | Status: SHIPPED | OUTPATIENT
Start: 2022-09-28 | End: 2022-10-05
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
## 2022-10-04 ENCOUNTER — OFFICE VISIT (OUTPATIENT)
Dept: INTERNAL MEDICINE CLINIC | Facility: CLINIC | Age: 42
End: 2022-10-04

## 2022-10-04 VITALS
BODY MASS INDEX: 29.71 KG/M2 | HEART RATE: 85 BPM | OXYGEN SATURATION: 98 % | TEMPERATURE: 98.2 F | SYSTOLIC BLOOD PRESSURE: 125 MMHG | HEIGHT: 64 IN | DIASTOLIC BLOOD PRESSURE: 86 MMHG | WEIGHT: 174 LBS

## 2022-10-04 DIAGNOSIS — B02.29 OTHER POSTHERPETIC NERVOUS SYSTEM INVOLVEMENT: Primary | ICD-10-CM

## 2022-10-04 PROCEDURE — 99213 OFFICE O/P EST LOW 20 MIN: CPT | Performed by: INTERNAL MEDICINE

## 2022-10-04 RX ORDER — VALACYCLOVIR HYDROCHLORIDE 1 G/1
1000 TABLET, FILM COATED ORAL 2 TIMES DAILY
Qty: 10 TABLET | Refills: 0 | Status: SHIPPED | OUTPATIENT
Start: 2022-10-04 | End: 2022-10-09

## 2022-10-04 NOTE — ASSESSMENT & PLAN NOTE
Back pain last seen in office by Dr Geraldo Akers who prescribed lidoderm patch    For 2 days patient has had evolving itchy rash, now with vessicles    Violacious, erythematous, and on thigh, scaled over  L2-3 dermatome on R  + chickenpox as child with recent stress    Not allergic to lidoderm or methcarbamol  rx for valtrex 5 days BID  If patient complaining of post-herpetic burning, consider gababpentin

## 2022-10-04 NOTE — PROGRESS NOTES
[address]   INTERNAL MEDICINE OFFICE VISIT     PATIENT INFORMATION     Savanna Jacques   42 y o  male   MRN: [id number]    ASSESSMENT/PLAN     1  Other postherpetic nervous system involvement  Assessment & Plan:  Back pain last seen in office by Dr Sylvester Prater who prescribed lidoderm patch    For 2 days patient has had evolving itchy rash, now with vessicles  Violacious, erythematous, and on thigh, scaled over  L2-3 dermatome on R  + chickenpox as child with recent stress    Not allergic to lidoderm or methcarbamol  rx for valtrex 5 days BID  If patient complaining of post-herpetic burning, consider gababpentin    Orders:  -     valACYclovir (VALTREX) 1,000 mg tablet; Take 1 tablet (1,000 mg total) by mouth 2 (two) times a day for 5 days        Schedule a follow-up appointment as needed with PCP  HEALTH MAINTENANCE     Immunization History   Administered Date(s) Administered    Tdap 12/01/2016     Immunizations:  · Will need flu next visit  Screening:  · Annual physical and BMI       CHIEF COMPLAINT     No chief complaint on file  HISTORY OF PRESENT ILLNESS     Patient 42 old male with no significant PMHx who presents as same day for rash concerns  A few days prior, he was given lidocaine patches, and when removed 2 days ago, rash, which is quite itchy, erupted  Did not take benedryl, hydrocortisone or other medications for symptoms  Rash progressed to "pimples" yesterday which prompted him to come in for evaluation  No allergies known otherwise  Patient did have Chicken pox when child and stressed    REVIEW OF SYSTEMS     Review of Systems   Constitutional: Positive for chills and fever  Negative for fatigue  Eyes: Negative for visual disturbance  Respiratory: Negative for shortness of breath and wheezing  Cardiovascular: Negative for chest pain and palpitations  Gastrointestinal: Negative for abdominal pain, diarrhea, nausea and vomiting     Skin: Positive for rash      OBJECTIVE     Vitals:    10/04/22 1604   BP: 125/86   BP Location: Right arm   Patient Position: Sitting   Cuff Size: Large   Pulse: 85   Temp: 98 2 °F (36 8 °C)   TempSrc: Temporal   SpO2: 98%   Weight: 78 9 kg (174 lb)   Height: 5' 4" (1 626 m)     Physical Exam  Constitutional:       Appearance: He is obese  He is not ill-appearing  Cardiovascular:      Rate and Rhythm: Normal rate  Pulses: Normal pulses  Heart sounds: No murmur heard  Pulmonary:      Effort: Pulmonary effort is normal       Breath sounds: No wheezing or rales  Abdominal:      General: Bowel sounds are normal       Palpations: Abdomen is soft  Tenderness: There is no abdominal tenderness  There is no guarding  Skin:     General: Skin is warm and dry  Findings: Erythema, lesion and rash present  Comments: Violaceous, erythematous, vesicular, L2/3 dermatome back and R thigh   Neurological:      Mental Status: He is alert  Psychiatric:         Mood and Affect: Mood normal          Behavior: Behavior normal        CURRENT MEDICATIONS     Current Outpatient Medications:     Diclofenac Sodium (VOLTAREN) 1 %, Apply 2 g topically 4 (four) times a day, Disp: 350 g, Rfl: 1    methocarbamol (ROBAXIN) 500 mg tablet, Take 1 tablet (500 mg total) by mouth 3 (three) times a day for 7 days, Disp: 21 tablet, Rfl: 0    valACYclovir (VALTREX) 1,000 mg tablet, Take 1 tablet (1,000 mg total) by mouth 2 (two) times a day for 5 days, Disp: 10 tablet, Rfl: 0    lidocaine (Lidoderm) 5 %, Apply 1 patch topically daily Remove & Discard patch within 12 hours or as directed by MD (Patient not taking: Reported on 10/4/2022), Disp: 30 patch, Rfl: 2    rivaroxaban (XARELTO) 15 & 20 MG starter pack, Take 15 mg by mouth twice daily for 21 days, then 20 mg once daily thereafter  (Patient not taking: No sig reported), Disp: 51 each, Rfl: 0    PAST MEDICAL & SURGICAL HISTORY   No past medical history on file    Past Surgical History: Procedure Laterality Date    NEPHRECTOMY Right      SOCIAL & FAMILY HISTORY     Social History     Socioeconomic History    Marital status: Single     Spouse name: Not on file    Number of children: Not on file    Years of education: Not on file    Highest education level: Not on file   Occupational History    Not on file   Tobacco Use    Smoking status: Former Smoker     Packs/day: 0 50     Types: Cigarettes    Smokeless tobacco: Never Used   Vaping Use    Vaping Use: Some days   Substance and Sexual Activity    Alcohol use: Yes     Comment: social    Drug use: Yes     Types: Marijuana    Sexual activity: Yes     Partners: Female   Other Topics Concern    Not on file   Social History Narrative    Not on file     Social Determinants of Health     Financial Resource Strain: Low Risk     Difficulty of Paying Living Expenses: Not hard at all   Food Insecurity: No Food Insecurity    Worried About [address] in the Last Year: Never true    [employer] [employer] in the Last Year: Never true   Transportation Needs: No Transportation Needs    Lack of Transportation (Medical): No    Lack of Transportation (Non-Medical): No   Physical Activity: Not on file   Stress: Not on file   Social Connections: Not on file   Intimate Partner Violence: Not on file   Housing Stability: Not on file     Social History     Substance and Sexual Activity   Alcohol Use Yes    Comment: social       Social History     Substance and Sexual Activity   Drug Use Yes    Types: Marijuana     Social History     Tobacco Use   Smoking Status Former Smoker    Packs/day: 0 50    Types: Cigarettes   Smokeless Tobacco Never Used     Family History   Problem Relation Age of Onset    No Known Problems Father     Asthma Son     Asthma Daughter      ==  DO Michael Costa [address] [facility] [address] , [facility] [address] [facility], [address] [facility]  Office: [phone]  Fax: [phone]

## 2022-10-07 ENCOUNTER — TELEPHONE (OUTPATIENT)
Dept: INTERNAL MEDICINE CLINIC | Facility: CLINIC | Age: 42
End: 2022-10-07

## 2022-10-07 NOTE — TELEPHONE ENCOUNTER
Folder Color- [id number]    Name of [address] [place] [address] Standard Written Order    Form to be filled out by- Dr Jeanie Stallings    Form to be Faxed ([phone])    Patient made aware of 10 business day policy

## 2022-10-13 ENCOUNTER — TELEPHONE (OUTPATIENT)
Dept: INTERNAL MEDICINE CLINIC | Facility: CLINIC | Age: 42
End: 2022-10-13

## 2022-10-13 NOTE — TELEPHONE ENCOUNTER
Folder Color- Red     Name of [address] [place] [address] SMO/AFO/ALL Vertification     Form to be filled out by- Flaquito Patricio    Form to be Faxed: [phone]    Patient made aware of 10 business day policy

## 2022-10-24 ENCOUNTER — HOSPITAL ENCOUNTER (OUTPATIENT)
Dept: NON INVASIVE DIAGNOSTICS | Facility: CLINIC | Age: 42
Discharge: HOME | End: 2022-10-24
Payer: COMMERCIAL

## 2022-10-24 DIAGNOSIS — R29.898 WEAKNESS OF RIGHT LOWER EXTREMITY: ICD-10-CM

## 2022-10-24 PROCEDURE — 93971 EXTREMITY STUDY: CPT

## 2022-10-24 PROCEDURE — 93971 EXTREMITY STUDY: CPT | Performed by: SURGERY

## 2023-01-04 ENCOUNTER — PATIENT OUTREACH (OUTPATIENT)
Dept: INTERNAL MEDICINE CLINIC | Facility: CLINIC | Age: 43
End: 2023-01-04

## 2023-01-04 ENCOUNTER — OFFICE VISIT (OUTPATIENT)
Dept: INTERNAL MEDICINE CLINIC | Facility: CLINIC | Age: 43
End: 2023-01-04

## 2023-01-04 VITALS
HEART RATE: 85 BPM | WEIGHT: 178.6 LBS | DIASTOLIC BLOOD PRESSURE: 80 MMHG | TEMPERATURE: 97.3 F | HEIGHT: 64 IN | BODY MASS INDEX: 30.49 KG/M2 | SYSTOLIC BLOOD PRESSURE: 116 MMHG | OXYGEN SATURATION: 98 %

## 2023-01-04 DIAGNOSIS — Z86.718 HISTORY OF DVT (DEEP VEIN THROMBOSIS): ICD-10-CM

## 2023-01-04 DIAGNOSIS — Z11.59 NEED FOR HEPATITIS B SCREENING TEST: ICD-10-CM

## 2023-01-04 DIAGNOSIS — Z23 NEED FOR INFLUENZA VACCINATION: ICD-10-CM

## 2023-01-04 DIAGNOSIS — Z59.9 FINANCIAL DIFFICULTIES: ICD-10-CM

## 2023-01-04 DIAGNOSIS — L98.9 SKIN LESION: ICD-10-CM

## 2023-01-04 DIAGNOSIS — Z23 NEED FOR HEPATITIS B VACCINATION: ICD-10-CM

## 2023-01-04 DIAGNOSIS — Z00.00 ANNUAL PHYSICAL EXAM: Primary | ICD-10-CM

## 2023-01-04 DIAGNOSIS — F12.10 DRUG ABUSE, MARIJUANA: ICD-10-CM

## 2023-01-04 RX ORDER — GINSENG 100 MG
1 CAPSULE ORAL 2 TIMES DAILY
Qty: 15 G | Refills: 1 | Status: SHIPPED | OUTPATIENT
Start: 2023-01-04

## 2023-01-04 SDOH — ECONOMIC STABILITY - INCOME SECURITY: PROBLEM RELATED TO HOUSING AND ECONOMIC CIRCUMSTANCES, UNSPECIFIED: Z59.9

## 2023-01-04 NOTE — PROGRESS NOTES
MERLY received a new referral in regard to pt identified as having financial concerns and possible difficulty with transportation  MERLY attempted to reach pt and was unable  A message was left in Czech to please return [facility] call  MERLY will remain available

## 2023-01-05 NOTE — PATIENT INSTRUCTIONS

## 2023-01-05 NOTE — PROGRESS NOTES
[facility]    NAME: Carolyn Kingston  AGE: 36 y o  SEX: male  : 1980     DATE: 2023     Assessment and Plan:     Problem List Items Addressed This Visit    None  Visit Diagnoses     Annual physical exam    -  Primary    Relevant Orders    CBC and differential    Comprehensive metabolic panel    Financial difficulties        Relevant Orders    Ambulatory referral to social work care management program  Discussed with our office CM to call patient and discuss, provide info about available resources as appropriate     Need for influenza vaccination        Relevant Orders    influenza vaccine, quadrivalent, 0 5 mL, preservative-free, for adult and pediatric patients 6 mos+ (AFLURIA, FLUARIX, FLULAVAL, FLUZONE) (Completed)            Skin lesion        Relevant Medications    bacitracin topical ointment 500 units/g topical ointment    Need for hepatitis B screening test   Need for hepatitis B vaccination       Relevant Orders    Hepatitis B Core Ab W/Reflex    Hep B Surface Ab, Ql    Hepatitis B surface antigen  considering Hep B vaccination next visit pending serum results    History of DVT (deep vein thrombosis)        Relevant Orders    Had DVT age < 38 y , however with known sedentary life / limited physical activity due to remote back injury with bilateral leg atrophy/gain problem  Thrombosis Panel    Drug abuse, marijuana      Smokes marijuana 2-3 times daily for 5-6 years   Counseled on modernization/decrease use if not quitting given possible health problems including lung disease/cancer, cognitive and behavioral side effects  In precontemplation   Denies using regular cigarettes, smokeless tobacco or any other recreational drugs than marijuana  Immunizations and preventive care screenings were discussed with patient today   Appropriate education was printed on patient's after visit summary  Counseling:  Alcohol/drug use: Patient drinks alcohol very occasionally, a drink or 2 every few months with, discussed moderation in alcohol intake, the recommendations for healthy alcohol use, and avoidance of illicit drug use  Dental Health: Reports following up regularly with dentist, good oral hygiene evident on exam discussed importance of regular tooth brushing, flossing, and dental visits  Sexual health: Reported monogamous, and denies any STD discussed sexually transmitted diseases, partner selection, use of condoms, avoidance of unintended pregnancy, and contraceptive alternatives  · Exercise: Reports regularly active and walks few days a week the importance of regular exercise/physical activity was discussed  Recommend exercise 3-5 times per week for at least 30 minutes  Return in about 6 months (around 7/4/2023) for Recheck  Chief Complaint:     Chief Complaint   Patient presents with   • Annual Exam      History of Present Illness:     Adult Annual Physical   Patient here for a comprehensive physical exam  The patient reports problems - Chronic bilateral feet/lower leg weakness and atrophy due to remote back injury, wearing new boots for podiatry however with minimal superficial skin injury due to friction against his skin for which baricitrabin cream prescribed and instructions to call and have the boots adjusted  Diet and Physical Activity  · Diet/Nutrition: well balanced diet  · Exercise: walking  Depression Screening  PHQ-2/9 Depression Screening         General Health  · Sleep: sleeps well  · Hearing: normal - bilateral   · Vision: no vision problems  · Dental: regular dental visits   Health  · Symptoms include: none     Review of Systems:     Review of Systems   Constitutional: Negative for activity change, appetite change, chills, diaphoresis, fatigue, fever and unexpected weight change     HENT: Negative for congestion, hearing loss, mouth sores, nosebleeds, postnasal drip, rhinorrhea, sinus pressure, sore throat and trouble swallowing  Eyes: Negative for photophobia and visual disturbance  Respiratory: Negative for shortness of breath and wheezing  Cardiovascular: Negative for chest pain, palpitations and leg swelling  Gastrointestinal: Negative for abdominal distention, abdominal pain, blood in stool, constipation, diarrhea, nausea and vomiting  Genitourinary: Negative for decreased urine volume, difficulty urinating, dysuria, flank pain and hematuria  Musculoskeletal: Positive for gait problem  Negative for arthralgias, back pain, joint swelling, myalgias, neck pain and neck stiffness  Chronic , stable, able to ambulate independently, denies falls or imbalance; wearing bilateral new ortho boots   Skin: Positive for wound  Negative for rash  Right calf small wound x few days    Neurological: Negative for dizziness, weakness, light-headedness and headaches  Psychiatric/Behavioral: Negative for agitation, behavioral problems, self-injury, sleep disturbance and suicidal ideas  Past Medical History:     History reviewed  No pertinent past medical history     Past Surgical History:     Past Surgical History:   Procedure Laterality Date   • NEPHRECTOMY Right       Family History:     Family History   Problem Relation Age of Onset   • No Known Problems Father    • Asthma Son    • Asthma Daughter       Social History:     Social History     Socioeconomic History   • Marital status: Single     Spouse name: None   • Number of children: None   • Years of education: None   • Highest education level: None   Occupational History   • None   Tobacco Use   • Smoking status: Former     Packs/day: 0 50     Types: Cigarettes   • Smokeless tobacco: Never   Vaping Use   • Vaping Use: Some days   • Substances: Nicotine (5%), Flavoring   Substance and Sexual Activity   • Alcohol use: Yes     Comment: social   • Drug use: Yes     Types: Marijuana • Sexual activity: Yes     Partners: Female   Other Topics Concern   • None   Social History Narrative   • None     Social Determinants of Health     Financial Resource Strain: High Risk   • Difficulty of Paying Living Expenses: Very hard   Food Insecurity: No Food Insecurity   • Worried About Running Out of Food in the Last Year: Never true   • Ran Out of Food in the Last Year: Never true   Transportation Needs: No Transportation Needs   • Lack of Transportation (Medical): No   • Lack of Transportation (Non-Medical): No   Physical Activity: Not on file   Stress: Not on file   Social Connections: Not on file   Intimate Partner Violence: Not on file   Housing Stability: Not on file      Current Medications:     Current Outpatient Medications   Medication Sig Dispense Refill   • bacitracin topical ointment 500 units/g topical ointment Apply 1 large application topically 2 (two) times a day 15 g 1     No current facility-administered medications for this visit  Allergies:     No Known Allergies   Physical Exam:     /80 (BP Location: Left arm, Patient Position: Sitting, Cuff Size: Large)   Pulse 85   Temp (!) 97 3 °F (36 3 °C) (Temporal)   Ht 5' 4" (1 626 m)   Wt 81 kg (178 lb 9 6 oz)   SpO2 98%   BMI 30 66 kg/m²     Physical Exam  Vitals reviewed  Constitutional:       General: He is not in acute distress  Appearance: Normal appearance  He is normal weight  He is not ill-appearing, toxic-appearing or diaphoretic  HENT:      Head: Normocephalic and atraumatic  Right Ear: External ear normal       Left Ear: External ear normal       Nose: Nose normal  No congestion or rhinorrhea  Mouth/Throat:      Mouth: Mucous membranes are moist       Pharynx: Oropharynx is clear  No oropharyngeal exudate or posterior oropharyngeal erythema  Eyes:      General: No scleral icterus  Right eye: No discharge  Left eye: No discharge        Extraocular Movements: Extraocular movements intact  Conjunctiva/sclera: Conjunctivae normal       Pupils: Pupils are equal, round, and reactive to light  Cardiovascular:      Rate and Rhythm: Normal rate and regular rhythm  Pulses: Normal pulses  Heart sounds: Normal heart sounds  No murmur heard  No gallop  Pulmonary:      Effort: Pulmonary effort is normal  No respiratory distress  Breath sounds: Normal breath sounds  No wheezing, rhonchi or rales  Chest:      Chest wall: No tenderness  Abdominal:      General: Abdomen is flat  There is no distension  Palpations: Abdomen is soft  There is no mass  Tenderness: There is no abdominal tenderness  Musculoskeletal:         General: No swelling  Cervical back: Normal range of motion and neck supple  No rigidity  Right lower leg: No edema  Left lower leg: No edema  Lymphadenopathy:      Cervical: No cervical adenopathy  Skin:     Capillary Refill: Capillary refill takes less than 2 seconds  Coloration: Skin is not jaundiced or pale  Findings: Lesion present  No bruising or rash  Comments: superficial small lesion about 4 mm with minimal erythema no swelling or tenderness at site of orthopedic boot friction against skin of proximal right calf   Neurological:      General: No focal deficit present  Mental Status: He is alert and oriented to person, place, and time  Cranial Nerves: No cranial nerve deficit  Motor: No weakness  Gait: Gait abnormal       Comments: Abnormal gate at baseline d/t known remote back injury and bilateral distal legs / feet weakness and atrophy  Psychiatric:         Mood and Affect: Mood normal          Behavior: Behavior normal          Thought Content:  Thought content normal          Judgment: Judgment normal           Lucien Loe DO  [address]

## 2023-01-10 ENCOUNTER — PATIENT OUTREACH (OUTPATIENT)
Dept: INTERNAL MEDICINE CLINIC | Facility: CLINIC | Age: 43
End: 2023-01-10

## 2023-01-10 DIAGNOSIS — Z78.9 NEEDS ASSISTANCE WITH COMMUNITY RESOURCES: Primary | ICD-10-CM
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
## 2023-02-01 ENCOUNTER — PATIENT OUTREACH (OUTPATIENT)
Dept: INTERNAL MEDICINE CLINIC | Facility: CLINIC | Age: 43
End: 2023-02-01

## 2023-02-01 NOTE — PROGRESS NOTES
[facility] did call patient to discuss referral received  [facility] introduced herself and her role  CHW assessment was completed, and patient agreed to services      [facility] will meet patient at the PCP offices to obtain signatures for Jamin Bone application on 3/4/4695 at 11:00 pm

## 2023-02-02 ENCOUNTER — PATIENT OUTREACH (OUTPATIENT)
Dept: INTERNAL MEDICINE CLINIC | Facility: CLINIC | Age: 43
End: 2023-02-02

## 2023-02-02 NOTE — PROGRESS NOTES
[address] [place] [address] review patient chart and complete Elta Crews application and is ready for patient to sign  Addendum:    CMOC met with the patient at the PCP office to obtain the patient's signature  CMOC helped fill out the [facility]  [employer] application that the patient already had on hand for rent and utility assistance  CMOC help patient to [facility] the [facility] to [phone] to made an appointment to get a equipment repair  Patient will have his appointment on 6/2/2023 at 10:00 am in [facility] also helped the patient make the appointment with the SSA to apply for SSI  Patient will have his appointment with the [address] on 2/23/23 at 1:30 PM  the patient will come to the PCP offices for [address] [place] [address] to assist in the call

## 2023-02-21 ENCOUNTER — PATIENT OUTREACH (OUTPATIENT)
Dept: INTERNAL MEDICINE CLINIC | Facility: CLINIC | Age: 43
End: 2023-02-21

## 2023-02-21 NOTE — PROGRESS NOTES
MERLY has met withpt , son Jonathon Tan and [employer] as pt and son needed a Hardship Letter for [employer] where they are applying for Rental Help as they are 4 months behind on their rent  MERLY has assisted with same  [employer] is also assisting pt with his [facility] [place] and  Disability and housing application  CM Team to remain avavialbe to assist as indicated

## 2023-02-21 NOTE — PROGRESS NOTES
[facility] assisted patient with the Rogena Dakin to [facility] to provided this to [facility] [place]  Patient's son Mega De Los Santos was with us and provided his information to attach this to the letter  [facility] assist patient's son Mega De Los Santos to the from desk to make an appointment with a primary care physician since he doesn't have one  [facility] will meet patient at the PCP offices to assist him with his appointment with the Social Security by phone on 2/23/23 at 1:30 PM     Addendum:     [facility] called Cecilia Méndez to verify the status of the patient's application  Representative Cristofer Figueroa said that a correction had to be done in the first part of the application  (the date of birth)  OC will make the correction and send it by Email to [facility]  Document scanned in chart for future need
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
## 2023-02-23 ENCOUNTER — PATIENT OUTREACH (OUTPATIENT)
Dept: INTERNAL MEDICINE CLINIC | Facility: CLINIC | Age: 43
End: 2023-02-23

## 2023-02-24 NOTE — PROGRESS NOTES
[facility] met with the patient in the PCP office to received the call from Marlee Sumner for his interview by phone  What we expect the call we review all the applications that we already apply  SNAP- Approved Expedited Only   Patient was approved only for this month for $ 271 but the patient has to complete the documents to be able to continue with the benefits  Patient will provided the document to [facility] to complete the step  Patient will contact [facility] went he has his lease from the [facility]    Application is complete patient will be contact form Celina Martinez for his evaluation  [employer]  Patient is working with them and collecting all the document to complete the application  Patient received the call for social security and complete interview along with [facility]  Patient was Denied for SSI but its waiting for the [facility] decision  Rep  Mrs Zuhair Edgar told the patient that will take about 4 or 6 to get any responds  [facility] will continue follow with patient

## 2023-03-02 ENCOUNTER — OFFICE VISIT (OUTPATIENT)
Dept: INTERNAL MEDICINE CLINIC | Facility: CLINIC | Age: 43
End: 2023-03-02

## 2023-03-02 VITALS
WEIGHT: 178 LBS | DIASTOLIC BLOOD PRESSURE: 79 MMHG | BODY MASS INDEX: 30.39 KG/M2 | HEIGHT: 64 IN | HEART RATE: 72 BPM | TEMPERATURE: 98 F | OXYGEN SATURATION: 98 % | SYSTOLIC BLOOD PRESSURE: 111 MMHG

## 2023-03-02 DIAGNOSIS — S89.91XA INJURY OF RIGHT KNEE, INITIAL ENCOUNTER: Primary | ICD-10-CM

## 2023-03-02 RX ORDER — SENNOSIDES 8.6 MG
650 CAPSULE ORAL EVERY 8 HOURS PRN
Qty: 30 TABLET | Refills: 0 | Status: SHIPPED | OUTPATIENT
Start: 2023-03-02
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
## 2023-03-03 ENCOUNTER — HOSPITAL ENCOUNTER (OUTPATIENT)
Dept: RADIOLOGY | Facility: HOSPITAL | Age: 43
Discharge: HOME | End: 2023-03-03

## 2023-03-03 DIAGNOSIS — S89.91XA INJURY OF RIGHT KNEE, INITIAL ENCOUNTER: ICD-10-CM

## 2023-03-08 NOTE — RESULT ENCOUNTER NOTE
Please call the patient regarding his recent Xray of knee  Xray showed no fracture or dislocation  There is mild osteoarthritis (wear and tear) of the knee  I would recommend continuing Tylenol as needed for pain  Thank you

## 2023-03-14 ENCOUNTER — PATIENT OUTREACH (OUTPATIENT)
Dept: INTERNAL MEDICINE CLINIC | Facility: CLINIC | Age: 43
End: 2023-03-14

## 2023-03-15 NOTE — PROGRESS NOTES
[facility] met with the patient to fill out the medical and Work history report forms received by the social security administration  CMOC completed the form along with the patient and review all other application  [employer] Approved: application was complete and approved  representative inform the patient that the funds will take between 2 or 6 week  SNAP Approved: patient was approved for snap for $228 a month  SSI Application still in process  Fernandez Butcher: CMOC called lanvera to verify the patient's application  Keron inform [facility] that the application is missing part 2  Jamel Rhoades will sent the part 2 to the patient's House  [facility] will continue assist patient with [place] and SSI

## 2023-03-24 ENCOUNTER — PATIENT OUTREACH (OUTPATIENT)
Dept: MULTI SPECIALTY CLINIC | Facility: CLINIC | Age: 43
End: 2023-03-24

## 2023-03-27 NOTE — PROGRESS NOTES
[facility] met with the patient at the PCP office to verify documents received at home  CMOC reviewed the documents and part of them was for his son  CMOC recommended to the patient that his son should make an appointment with Financial counselors to obtain help with the bills received  Patient received a form from Karri Wood to complete  CMOC complete the page with the patient and verify all the information along with the patient  [facility] will sent this page by e-mail to Karri Wood to complete this process  Document scanned in chart for futures needs

## 2023-05-17 ENCOUNTER — TELEPHONE (OUTPATIENT)
Dept: INTERNAL MEDICINE CLINIC | Facility: CLINIC | Age: 43
End: 2023-05-17

## 2023-05-17 NOTE — TELEPHONE ENCOUNTER
"Patient called in to schedule an appointment for med refills with PCP  I informed patient that PCP does not have any openings and would also be graduating  Patient asked who new PCP is  I explained that Dr Rajesh Weinstein is assigned to accept Dr Rodriguez Standing patient's however his PCP can technically be any 1st year resident with an early opening  Patient was upset and said \"I hate these types of changes, I would like a permanent doctor  I explained that unfortunately, if he wants someone permanent it  would have to be with [facility] or [facility]  Patient was angry and he disconnected the call before scheduling       "

## 2023-06-02 ENCOUNTER — PATIENT OUTREACH (OUTPATIENT)
Dept: INTERNAL MEDICINE CLINIC | Facility: CLINIC | Age: 43
End: 2023-06-02

## 2023-06-02 NOTE — PROGRESS NOTES
SW has received an IN BASKET update from [email or web address] that she has completed all Goals  Pt approved for Fernando Jackson and [facility] [employer]  Pt's SSI denied and CMOC helped print needed SS Appeal Forms  Pt to f/u with  re same  Patient does not have any further questions, concerns, or other needs at this time  Patient has my contact # and PCP office # if needed  Social Work to remain available to assist as indicated  Please re-consult Social Work if needed